# Patient Record
Sex: FEMALE | Race: WHITE | NOT HISPANIC OR LATINO | Employment: OTHER | URBAN - METROPOLITAN AREA
[De-identification: names, ages, dates, MRNs, and addresses within clinical notes are randomized per-mention and may not be internally consistent; named-entity substitution may affect disease eponyms.]

---

## 2017-10-20 ENCOUNTER — TRANSFERRED RECORDS (OUTPATIENT)
Dept: HEALTH INFORMATION MANAGEMENT | Facility: CLINIC | Age: 72
End: 2017-10-20

## 2017-10-25 ENCOUNTER — TRANSFERRED RECORDS (OUTPATIENT)
Dept: HEALTH INFORMATION MANAGEMENT | Facility: CLINIC | Age: 72
End: 2017-10-25

## 2017-11-02 ENCOUNTER — TRANSFERRED RECORDS (OUTPATIENT)
Dept: HEALTH INFORMATION MANAGEMENT | Facility: CLINIC | Age: 72
End: 2017-11-02

## 2018-06-28 ENCOUNTER — TRANSFERRED RECORDS (OUTPATIENT)
Dept: HEALTH INFORMATION MANAGEMENT | Facility: CLINIC | Age: 73
End: 2018-06-28

## 2018-07-13 ENCOUNTER — TRANSFERRED RECORDS (OUTPATIENT)
Dept: HEALTH INFORMATION MANAGEMENT | Facility: CLINIC | Age: 73
End: 2018-07-13

## 2019-12-04 ENCOUNTER — TRANSFERRED RECORDS (OUTPATIENT)
Dept: HEALTH INFORMATION MANAGEMENT | Facility: CLINIC | Age: 74
End: 2019-12-04

## 2020-03-16 ENCOUNTER — TRANSFERRED RECORDS (OUTPATIENT)
Dept: HEALTH INFORMATION MANAGEMENT | Facility: CLINIC | Age: 75
End: 2020-03-16

## 2020-12-21 ENCOUNTER — TRANSFERRED RECORDS (OUTPATIENT)
Dept: HEALTH INFORMATION MANAGEMENT | Facility: CLINIC | Age: 75
End: 2020-12-21

## 2021-01-20 NOTE — TELEPHONE ENCOUNTER
RECORDS RECEIVED FROM: Self   Date of Appt: 1/29/21   NOTES (FOR ALL VISITS) STATUS DETAILS   OFFICE NOTE from referring provider N/A    OFFICE NOTE from other specialist Care Everywhere Zaida Dixon NP @ [facility]:  11/6/20    Dr Yury Melendez @ [facility]:  3/16/20  12/4/19  6/11/19  11/29/18    Dr. Tay Rojas @ [facility]:  7/13/18   DISCHARGE SUMMARY from hospital N/A    DISCHARGE REPORT from the ER N/A    OPERATIVE REPORT N/A    MEDICATION LIST Care Everywhere    IMAGING  (FOR ALL VISITS)     EMG Care Everywhere [facility]:  11/27/17   EEG N/A    LUMBAR PUNCTURE N/A    JUSTA SCAN N/A    DEXA SCAN *NEUROSURGERY* N/A    ULTRASOUND (CAROTID BILAT) *VASCULAR* N/A    MRI (HEAD, NECK, SPINE) Received  [facility]:  MRI Brain 10/23/17  MRI Cervical Spine 10/20/17   XRAY (SPINE) *NEUROSURGERY* N/A    CT (HEAD, NECK, SPINE) N/A       Action 1/20/21 MV 2.54pm   Action Taken Imaging request faxed to [facility] for:  MRI Brain 10/23/17  MRI Cervical Spine 10/20/17     Imaging Received  1/26/21 MV 7.54am  [facility]   Image Type (x): Disc:   PACS: x   Exam Date/Name MRI Brain 10/23/17  MRI Cervical Spine 10/20/17 Comments: images resolved in PACS

## 2021-01-29 ENCOUNTER — OFFICE VISIT (OUTPATIENT)
Dept: NEUROLOGY | Facility: CLINIC | Age: 76
End: 2021-01-29
Payer: MEDICARE

## 2021-01-29 ENCOUNTER — PRE VISIT (OUTPATIENT)
Dept: NEUROLOGY | Facility: CLINIC | Age: 76
End: 2021-01-29

## 2021-01-29 VITALS
HEART RATE: 92 BPM | HEIGHT: 62 IN | SYSTOLIC BLOOD PRESSURE: 149 MMHG | WEIGHT: 132 LBS | BODY MASS INDEX: 24.29 KG/M2 | RESPIRATION RATE: 16 BRPM | DIASTOLIC BLOOD PRESSURE: 70 MMHG | OXYGEN SATURATION: 97 %

## 2021-01-29 DIAGNOSIS — F41.9 ANXIETY AND DEPRESSION: ICD-10-CM

## 2021-01-29 DIAGNOSIS — G20.A1 PARKINSON DISEASE (H): Primary | ICD-10-CM

## 2021-01-29 DIAGNOSIS — G47.52 REM SLEEP BEHAVIOR DISORDER: ICD-10-CM

## 2021-01-29 DIAGNOSIS — F32.A ANXIETY AND DEPRESSION: ICD-10-CM

## 2021-01-29 PROBLEM — M85.80 OSTEOPENIA: Status: ACTIVE | Noted: 2018-12-14

## 2021-01-29 PROBLEM — F41.1 GENERALIZED ANXIETY DISORDER: Status: ACTIVE | Noted: 2018-12-05

## 2021-01-29 PROBLEM — M25.559 ARTHRALGIA OF HIP: Status: ACTIVE | Noted: 2017-10-09

## 2021-01-29 PROBLEM — G89.29 CHRONIC LOW BACK PAIN: Status: ACTIVE | Noted: 2017-10-19

## 2021-01-29 PROBLEM — M54.50 CHRONIC LOW BACK PAIN: Status: ACTIVE | Noted: 2017-10-19

## 2021-01-29 PROBLEM — F32.9 MAJOR DEPRESSIVE DISORDER WITH SINGLE EPISODE: Status: ACTIVE | Noted: 2020-11-06

## 2021-01-29 PROBLEM — R63.4 WEIGHT LOSS: Status: ACTIVE | Noted: 2018-07-02

## 2021-01-29 PROBLEM — R52 PAIN, GENERALIZED: Status: ACTIVE | Noted: 2017-10-19

## 2021-01-29 PROBLEM — R76.8 ELEVATED ANTINUCLEAR ANTIBODY (ANA) LEVEL: Status: ACTIVE | Noted: 2017-11-02

## 2021-01-29 PROBLEM — M25.531 BILATERAL WRIST PAIN: Status: ACTIVE | Noted: 2019-10-08

## 2021-01-29 PROBLEM — M54.2 NECK PAIN: Status: ACTIVE | Noted: 2017-10-19

## 2021-01-29 PROBLEM — Z86.79 PERSONAL HISTORY OF OTHER DISEASES OF THE CIRCULATORY SYSTEM: Status: ACTIVE | Noted: 2018-05-25

## 2021-01-29 PROBLEM — M25.532 BILATERAL WRIST PAIN: Status: ACTIVE | Noted: 2019-10-08

## 2021-01-29 PROBLEM — H61.20 IMPACTED CERUMEN: Status: ACTIVE | Noted: 2018-05-17

## 2021-01-29 PROBLEM — M54.12 CERVICAL RADICULOPATHY: Status: ACTIVE | Noted: 2017-10-09

## 2021-01-29 PROBLEM — R13.10 DYSPHAGIA: Status: ACTIVE | Noted: 2018-06-29

## 2021-01-29 PROBLEM — C44.310 BASAL CELL CARCINOMA (BCC) OF FACE: Status: ACTIVE | Noted: 2018-12-05

## 2021-01-29 PROBLEM — I10 ESSENTIAL HYPERTENSION: Status: ACTIVE | Noted: 2018-12-05

## 2021-01-29 PROCEDURE — 99417 PROLNG OP E/M EACH 15 MIN: CPT | Performed by: PSYCHIATRY & NEUROLOGY

## 2021-01-29 PROCEDURE — 99205 OFFICE O/P NEW HI 60 MIN: CPT | Performed by: PSYCHIATRY & NEUROLOGY

## 2021-01-29 RX ORDER — FOLIC ACID-PYRIDOXINE-CYANOCOBALAMIN TAB 2.5-25-2 MG 2.5-25-2 MG
1 TAB ORAL EVERY 24 HOURS
COMMUNITY
Start: 2020-04-06

## 2021-01-29 RX ORDER — MELOXICAM 15 MG/1
15 TABLET ORAL
COMMUNITY
Start: 2021-01-27

## 2021-01-29 RX ORDER — FLUDROCORTISONE ACETATE 0.1 MG/1
0.1 TABLET ORAL
COMMUNITY
Start: 2020-08-25 | End: 2021-01-29

## 2021-01-29 RX ORDER — DULOXETIN HYDROCHLORIDE 30 MG/1
30 CAPSULE, DELAYED RELEASE ORAL
COMMUNITY
Start: 2020-09-15 | End: 2021-11-10

## 2021-01-29 RX ORDER — CARBIDOPA AND LEVODOPA 25; 100 MG/1; MG/1
TABLET ORAL
Qty: 360 TABLET | Refills: 4 | Status: SHIPPED | OUTPATIENT
Start: 2021-01-29 | End: 2021-08-06

## 2021-01-29 RX ORDER — HYDROCHLOROTHIAZIDE 12.5 MG/1
12.5 TABLET ORAL
COMMUNITY
Start: 2021-01-27 | End: 2021-08-06

## 2021-01-29 RX ORDER — ATORVASTATIN CALCIUM 40 MG/1
40 TABLET, FILM COATED ORAL
COMMUNITY
Start: 2021-01-27

## 2021-01-29 RX ORDER — POTASSIUM CHLORIDE 750 MG/1
10 TABLET, EXTENDED RELEASE ORAL
COMMUNITY
Start: 2021-01-27

## 2021-01-29 RX ORDER — FLUDROCORTISONE ACETATE 0.1 MG/1
0.1 TABLET ORAL DAILY
Qty: 90 TABLET | Refills: 4 | Status: SHIPPED | OUTPATIENT
Start: 2021-01-29

## 2021-01-29 RX ORDER — CARBIDOPA 25 MG/1
TABLET ORAL
Qty: 90 TABLET | Refills: 4 | Status: SHIPPED | OUTPATIENT
Start: 2021-01-29 | End: 2021-08-06

## 2021-01-29 RX ORDER — ENTACAPONE 200 MG/1
TABLET ORAL
Qty: 360 TABLET | Refills: 4 | Status: SHIPPED | OUTPATIENT
Start: 2021-01-29

## 2021-01-29 RX ORDER — CARBIDOPA AND LEVODOPA 25; 100 MG/1; MG/1
TABLET ORAL
COMMUNITY
Start: 2021-01-15 | End: 2021-01-29

## 2021-01-29 RX ORDER — TRIAMCINOLONE ACETONIDE 1 MG/G
CREAM TOPICAL
COMMUNITY
Start: 2020-11-09

## 2021-01-29 RX ORDER — LISINOPRIL 5 MG/1
TABLET ORAL
COMMUNITY
Start: 2020-12-28

## 2021-01-29 ASSESSMENT — UNIFIED PARKINSONS DISEASE RATING SCALE (UPDRS)
DYSKINESIAS_PRESENT: YES
AMPLITUDE_LIP_JAW: NORMAL: NO TREMOR.
AMPLITUDE_RLE: NORMAL: NO TREMOR.
RIGIDITY_RLE: NORMAL
FINGER_TAPPING_LEFT: NORMAL
RIGIDITY_NECK: SLIGHT: RIGIDITY ONLY DETECTED WITH ACTIVATION MANEUVER.
AMPLITUDE_LLE: NORMAL: NO TREMOR.
ARISING_CHAIR: NORMAL: ABLE TO ARISE QUICKLY WITHOUT HESITATION.
FACIAL_EXPRESSION: NORMAL.
RIGIDITY_LUE: SLIGHT: RIGIDITY ONLY DETECTED WITH ACTIVATION MANEUVER.
HANDMOVEMENTS_LEFT: NORMAL
RIGIDITY_RUE: MILD: RIGIDITY DETECTED WITHOUT THE ACTIVATION MANEUVER.  FULL RANGE OF MOTION IS EASILY ACHIEVED.
POSTURE: 0 NORMAL, NO PROBLEMS
MOVEMENTS_INTERFERE_WITH_RATINGS: NO
TOETAPPING_RIGHT: NORMAL
FINGER_TAPPING_RIGHT: SLIGHT: ANY OF THE FOLLOWING: A) THE REGULAR RHYTHM IS BROKEN WITH ONE WITH ONE OR TWO INTERRUPTIONS OR HESITATIONS OF THE MOVEMENT B) SLIGHT SLOWING C) THE AMPLITUDE DECREMENTS NEAR THE END OF THE 10 MOVEMENTS.
SPONTANEITY_OF_MOVEMENT: 0: NORMAL.  NO PROBLEMS.
CONSTANCY_TREMOR_ATREST: NORMAL: NO TREMOR.
PARKINSONS_MEDS: ON
FREEZING_GAIT: NORMAL
SPEECH: NORMAL
HANDMOVEMENTS_RIGHT: NORMAL
TOETAPPING_LEFT: NORMAL
LEG_AGILITY_RIGHT: NORMAL
RIGIDITY_LLE: NORMAL
GAIT: NORMAL
AMPLITUDE_LUE: NORMAL: NO TREMOR.
LEG_AGILITY_LEFT: NORMAL
AMPLITUDE_RUE: NORMAL: NO TREMOR.

## 2021-01-29 ASSESSMENT — PAIN SCALES - GENERAL: PAINLEVEL: NO PAIN (0)

## 2021-01-29 ASSESSMENT — MIFFLIN-ST. JEOR: SCORE: 1052
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
## 2021-01-29 NOTE — NURSING NOTE
Chief Complaint   Patient presents with     Parkinson     [facility] NEW MOVEMENT DISORDER PD       Zoltan Meyer, EMT

## 2021-01-29 NOTE — LETTER
Date:March 28, 2021      Patient was self referred, no letter generated. Do not send.        [facility] Health Information

## 2021-01-30 NOTE — PATIENT INSTRUCTIONS
"Today you spoke with Dr. Ibarra. We discussed a plan to improve your Parkinson disease symptoms. The plan we discussed is detailed below.     Plan:   - Start Lodosyn 25 mg one tab with morning dose of CD/LD.   - Start Entacapone 100 mg, take one tab with every dose of CD/LD.   - No changes to CD/LD 25/100 mg, fludrocortisone (both refilled).  - To help with dream enactment start Melatonin.   - Take melatonin 3 mg at bedtime x 7 days. May increase by 3 mg weekly up to 15 mg at bedtime. Stop at the dose that improves active dreaming.    - We encourage safe exercise daily with walking and to stay social.  - Please call Lilibeth Rust from the [place] Chapter of APDA at [phone] for information regarding PD support groups around your area.   - PD resources:  www.parkinsonmn.org  www.healtheducation.[facility]/parkinsons  - APDA Exercise Mario application can be found at: [email or web address].BringMeTheNews.net/wp-content/uploads/2019/01/APDA-Mario-Application.pdf  - Exercises to try at home:   1. Go on a walk outside     2. The American Parkinson's Disease Association and the Parkinson's Foundation both have announced a number of online activities, including at-home fitness series and educational events. The link for APDA information and activities during the stay-at-home order is here:  https://www.apdaparkinson.org/article/covid-19-overview-for-pd-community/    3. Try a new virtual class!  - [employer] has a new YouTube channel that is free: https://Tempered Mind.org  - [employer] YouTube: https://www.youtube.com/user/Catherine  - \"Parkinson Seated Exercise\": https://www.youtube.com/watch?v=KNWqyKluZgg    4. Do your BIG and LOUD exercises  - If you would like to download the BIG homework helper ($28): https://www.[employer] (S[employer]E)/store/IdaProductDetailLSVT?Ya=37a5L693090scFTNTQ  - If you would like to download the LOUD homework helper ($28): " https://www.Jule Game.Snapstream/store/IdaProductDetailLSVT?Oy=31k5C240871xzDoLUI    5. ParkinsonTV: https://www.youtube.com/channel/UCtH-STbJ_fshxFjf7Srf_zQ    6. Mateusz Grant Webinars: https://www.[employer].org/webinars\    - We discussed DBS surgery. I discussed the process of evaluation for deep brain stimulation, which will consist of MRI of the brain, neuropsychometric evaluation, video on off testing and a meeting with one of our DBS neurosurgeons (Dr. Mateusz Barrera or Dr. Torsten Oliveros).   - Medtronic has a series of short videos to help Parkinson disease patients understand DBS Therapy. The videos combine engaging patient stories, animations, and education from clinicians.  The website address is: medtronicdbs.com. This is not an endorsement for [employer].   - DBS handbook: http://www.fvfiles.com/122955.pdf  - We also discussed other surgical options for treatment of medication-refractory tremor, including MRI focused high frequency ultrasound (HFUS) thalamotomy. While initial studies of unilateral HFUS for essential tremor look promising, we do not have significant longterm data (effect may wear off like previous thalamotomies were known to) and it has not been proven safe to do bilaterally.

## 2021-03-03 ENCOUNTER — DOCUMENTATION ONLY (OUTPATIENT)
Dept: CARE COORDINATION | Facility: CLINIC | Age: 76
End: 2021-03-03

## 2021-03-07 ENCOUNTER — HEALTH MAINTENANCE LETTER (OUTPATIENT)
Age: 76
End: 2021-03-07

## 2021-03-09 ENCOUNTER — VIRTUAL VISIT (OUTPATIENT)
Dept: NEUROLOGY | Facility: CLINIC | Age: 76
End: 2021-03-09
Payer: MEDICARE

## 2021-03-09 DIAGNOSIS — F41.9 ANXIETY AND DEPRESSION: ICD-10-CM

## 2021-03-09 DIAGNOSIS — F32.A ANXIETY AND DEPRESSION: ICD-10-CM

## 2021-03-09 DIAGNOSIS — G47.52 REM SLEEP BEHAVIOR DISORDER: ICD-10-CM

## 2021-03-09 DIAGNOSIS — G20.A1 PARKINSON DISEASE (H): Primary | ICD-10-CM

## 2021-03-09 PROCEDURE — 99215 OFFICE O/P EST HI 40 MIN: CPT | Mod: 95 | Performed by: PSYCHIATRY & NEUROLOGY

## 2021-03-09 SDOH — HEALTH STABILITY: MENTAL HEALTH: HOW OFTEN DO YOU HAVE A DRINK CONTAINING ALCOHOL?: NOT ASKED

## 2021-03-09 SDOH — HEALTH STABILITY: MENTAL HEALTH: HOW OFTEN DO YOU HAVE 6 OR MORE DRINKS ON ONE OCCASION?: NOT ASKED

## 2021-03-09 SDOH — HEALTH STABILITY: MENTAL HEALTH: HOW MANY STANDARD DRINKS CONTAINING ALCOHOL DO YOU HAVE ON A TYPICAL DAY?: NOT ASKED

## 2021-03-09 ASSESSMENT — UNIFIED PARKINSONS DISEASE RATING SCALE (UPDRS)
ARISING_CHAIR: NORMAL: ABLE TO ARISE QUICKLY WITHOUT HESITATION.
AMPLITUDE_RLE: NORMAL: NO TREMOR.
FREEZING_GAIT: NORMAL
PRONATION_SUPINATION_LEFT: NORMAL
HANDMOVEMENTS_RIGHT: NORMAL
FINGER_TAPPING_LEFT: NORMAL
AMPLITUDE_LLE: NORMAL: NO TREMOR.
MOVEMENTS_INTERFERE_WITH_RATINGS: NO
LEG_AGILITY_LEFT: NORMAL
TOETAPPING_LEFT: NORMAL
LEG_AGILITY_RIGHT: NORMAL
DYSKINESIAS_PRESENT: YES
PRONATION_SUPINATION_RIGHT: NORMAL
GAIT: NORMAL
TOETAPPING_RIGHT: NORMAL
FACIAL_EXPRESSION: SLIGHT: MINIMAL MASKED FACIES MANIFESTED ONLY BY DECREASED FREQUENCY OF BLINKING.
SPONTANEITY_OF_MOVEMENT: 0: NORMAL.  NO PROBLEMS.
HANDMOVEMENTS_LEFT: NORMAL
POSTURE: 0 NORMAL, NO PROBLEMS
AMPLITUDE_LUE: NORMAL: NO TREMOR.
SPEECH: NORMAL
AMPLITUDE_LIP_JAW: NORMAL: NO TREMOR.
FINGER_TAPPING_RIGHT: NORMAL
CONSTANCY_TREMOR_ATREST: NORMAL: NO TREMOR.
AMPLITUDE_RUE: NORMAL: NO TREMOR.

## 2021-03-09 NOTE — PROGRESS NOTES
Brittani is a 76 year old who is being evaluated via a billable video visit.      How would you like to obtain your AVS? Mychart  If the video visit is dropped, the invitation should be resent by: [phone]      Video Start/END Time:  Start: 03/09/2021 02:39 pm   Stop: 03/09/2021 02:53 pm    Video-Visit Details    Type of service:  Video Visit      Originating Location (pt. Location): Home    Distant Location (provider location):  [facility] NEUROLOGY [facility]     Platform used for Video Visit: Fusion Dynamic

## 2021-03-09 NOTE — PATIENT INSTRUCTIONS
"Today you spoke with Dr. Ibarra. I am so glad you are doing well and that your Parkinson disease symptoms are controlled. The plan we discussed is detailed below.     Plan:    - No changes to her current PD regimen  - PT for the Big program to help with fatigue  - Consider small naps in the day to help with fatigue  - Exercises to try at home:   1. Go on a walk outside     2. The American Parkinson's Disease Association and the Parkinson's Foundation both have announced a number of online activities, including at-home fitness series and educational events. The link for APDA information and activities during the stay-at-home order is here:  https://www.apdaparkinson.org/article/covid-19-overview-for-pd-community/    3. Try a new virtual class!  - [employer] has a new MarketoTube channel that is free: https://ezCater  - Silver [employer] YouTube: https://www.Trendabl.com/user/JenniferverSVinveli  - \"Parkinson Seated Exercise\": https://www.Petizens.comube.com/watch?v=KNWqyKluZgg    4. Do your BIG and LOUD exercises  - If you would like to download the BIG homework helper ($28): https://www.[employer]/store/DoppelgamesLSVT?Bo=53i0K451347uiNCUFF  - If you would like to download the LOUD homework helper ($28): https://www.[employer]/store/DoppelgamesLSVT?Pr=67q6I890478mnHeZBC    5. ParkinsonTV: https://www.Petizens.comube.com/channel/UCtH-STbJ_fshxFjf7Srf_zQ    6. Mateusz Grant Webinars: https://www.sergio.org/webinars    Patient to return in 6 months, for in-person visit, 30 minutes.     "

## 2021-03-09 NOTE — LETTER
"3/9/2021       RE: Brittani Abraham  [address] [address]  [facility] [address]     Dear Colleague,    Thank you for referring your patient, Brittani Abraham, to the [facility]UROLOGY [facility]IN[facility] [place] at [facility]. Please see a copy of my visit note below.    MOVEMENT DISORDERS TELEMEDICINE (video and/or telephone) VISIT:     PATIENT: Brittani Abraham    : 1945    DATE: 21    REASON FOR VISIT: parkinson disease follow up     After a review of the patient s situation, this visit was changed from an in-person visit to a Telemedicine visit to reduce the risk of COVID-19 exposure. The patient is being evaluated via a billable Telemedicine visit.    Ms. Abraham is a 76 year old female with PMH of Parkinson disease that presents via Telemedicine for follow up.    The patient's last visit was on 21 where we started entacapone for wearing off symptoms and Lodosyn for nausea. She also started melatonin for RBSD.    History obtained from patient and , Min. Patient reports she is \"better.\" She doesn't feel that her symptoms are as harsh or difficult to deal with. She is more relaxed and less tense. She is not experiencing wearing off and is On until she takes her next dose of PD meds. She is experiencing less nausea with Lodosyn. She denies SEs to her current PD meds. Denies dyskinesias that are bothersome. She is on melatonin 5 mg at bedtime and reports that she no longer acts out her dreams and is sleeping better through the night. Her main complaint is tiredness during the day.     I have reviewed and updated the patient's Past Medical History, Social History, Family History and Medication List.    Medications:  Current Outpatient Medications   Medication Sig Dispense Refill     atorvastatin (LIPITOR) 40 MG tablet Take 40 mg by mouth       carbidopa (LODOSYN) 25 MG TABS tablet Take one tab with first dose of Sinemet in the morning. 90 tablet 4     " carbidopa-levodopa (SINEMET)  MG tablet TAKE 1 TABLET BY MOUTH FOUR TIMES DAILY = 4 tabs/day total. Take one hour before a protein rich meal or 2 hours afterward. 360 tablet 4     DULoxetine (CYMBALTA) 30 MG capsule Take 30 mg by mouth       entacapone (COMTAN) 200 MG tablet Take one tab with each dose of Sinemet. 360 tablet 4     fa-pyridoxine-cyancobalamin (FOLBIC) 2.5-25-2 MG TABS per tablet Take 1 tablet by mouth every 24 hours       fludrocortisone (FLORINEF) 0.1 MG tablet Take 1 tablet (0.1 mg) by mouth daily 90 tablet 4     hydrochlorothiazide (HYDRODIURIL) 12.5 MG tablet Take 12.5 mg by mouth       lisinopril (ZESTRIL) 5 MG tablet        meloxicam (MOBIC) 15 MG tablet Take 15 mg by mouth       potassium chloride ER (K-TAB/KLOR-CON) 10 MEQ CR tablet Take 10 mEq by mouth       triamcinolone (KENALOG) 0.1 % external cream GEN EXT AA BID PRF ITCHING OR SWELLING        Movement Disorder-related Medications                    9:30 AM  1:30 PM  4:30 PM  8:30 PM  at bedtime    Carbidopa/levodopa 25/100 mg IR 1 1 1 1     Entacapone 200 mg  1 1 1  1      Melatonin 5 mg         1        Allergies:  Penicillins    Physical Exam:  GENERAL: alert, active, attentive, appropriately groomed   HEENT: normocephalic, eyes open with no discharge  CHEST: non labored breathing   PSYCH: mood stable     Neurologic Exam:  MENTAL STATUS: Alert and oriented to person, place, time, and situation. Follows commands. Recent and remote memory intact. Attention span and concentration intact. Fund of knowledge intact to current events.   SPEECH: Fluent, intact comprehension and articulation  CN: visual fields intact, EOMIB, facial movement symmetric, hearing grossly intact to conversation, tongue protrudes midline   MOTOR: Moves all extremities equally against gravity without difficulty  Involuntary movements:  Dyskinesias observed of her head and shoulder, not bothersome or noticeable to patient  COORDINATION: no dysmetria with  "FTN  GAIT: able to rise unassisted from a seated position, decreased arm swing b/l (though was walking in a tight space) and normal stride length, no en bloc turns, no ataxia    UPDRS III  UPDRS Values 3/9/2021   Medication    R Brain DBS:    L Brain DBS:    Dyskinesia (LID) Yes   Did LID interfere No   Speech 0   Facial Expression 1   Rigidity Neck    Rigidity RUE    Rigidity LUE    Rigidity RLE    Rigidity LLE    Finger Taps R 0   Finger Taps L 0   Hand Mvt R 0   Hand Mvt L 0   Pron-/Supinate R 0   Pron-/Supinate L 0   Toe Tap R 0   Toe Tap L 0   Leg Agility R 0   Leg Agility L 0   Arise From Chair 0   Gait 0   Gait Freezing 0   Posture 0   Global Spont Mvt 0   Postural Tremor RUE 0   Postural Tremor LUE 0   Kinetic Tremor RUE 0   Kinetic Tremor LUE 0   Rest Tremor RUE 0   Rest Tremor LUE 0   Rest Tremor RLE 0   Rest Tremor LLE 0   Rest Tremor Lip/Jaw 0   Rest Tremor Constancy 0       Assessment:  Brittani Abraham is a 76 year old female with Parkinson disease and whose main concern today is fatigue. At the last visit we started entacapone for wearing off symptoms and Lodosyn for nausea. She reports improvement in her PD symptoms with adding the above meds. She also started melatonin for RBSD which has improved this symptom, also. Her main complaint is fatigue and we discussed a PT referral for the Big program and short naps in the day to mitigate this symptom. No other complaints. She is satisfied with her current PD management/treatment.    -Parkinson's disease, akinetic-rigid form: Her symptom started 4-5 years ago with feeling unsure with walking which she associates to poor coordination. Denies numbness in her feet. She also reports \"taking forever to do something\", feeling slow and stiff with the right body affected more than the left. She associated these symptoms with getting older. She also observed decreased facial expression. Patient was diagnosed with likely Parkinson's disease by Dr. Yury Melendez and " "had been following with him since October 2017. He started her on CD/LD with good response to her PD symptoms.   -Wearing off symptoms after 3 hours that is associated with anxiety, upper body rigidity, shallow breathing, and a feeling on the inside (points to chest) that she describes as \"squishy\". This symptom is improved on her current PD medication regimen.  -Dyskinesia affecting head and RUE associated with wearing off of levodopa. Not bothersome to patient.   -REM behavioral sleep disorder: Improved on melatonin.  -History of anxiety, depression, panic attacks: Currently controlled on duloxetine.     Plan:   - No changes to her current PD regimen  - PT for the Big program to help with fatigue  - Consider small naps in the day to help with fatigue  - PD exercise resources provided     Patient to return in 6 months, for in-person visit, 30 minutes.     I have reviewed the note as documented above.  This accurately captures the substance of my conversation with the patient.    40 minutes spent on date of encounter doing chart reviews and exam and documentation and further activities as noted above.     Contact time:  Start: 03/09/2021 02:39 pm   Stop: 03/09/2021 02:53 pm    Edilia Ibarra DO, MA   of Neurology   [facility]       Brittani is a 76 year old who is being evaluated via a billable video visit.      How would you like to obtain your AVS? Mychart  If the video visit is dropped, the invitation should be resent by: [phone]      Video-Visit Details    Type of service:  Video Visit    Start: 03/09/2021 02:39 pm   Stop: 03/09/2021 02:53 pm    Originating Location (pt. Location): Home    Distant Location (provider location):  [facility] NEUROLOGY [facility]IN[facility] [place]     Platform used for Video Visit: Guilherme      Again, thank you for allowing me to participate in the care of your patient.      Sincerely,    Edilia Ibarra DO      "

## 2021-06-24 NOTE — PROGRESS NOTES
"MOVEMENT DISORDERS TELEMEDICINE (video and/or telephone) VISIT:     PATIENT: Brittani Abraham    : 1945    DATE: 21    REASON FOR VISIT: parkinson disease follow up     After a review of the patient s situation, this visit was changed from an in-person visit to a Telemedicine visit to reduce the risk of COVID-19 exposure. The patient is being evaluated via a billable Telemedicine visit.    Ms. Abraham is a 76 year old female with PMH of Parkinson disease that presents via Telemedicine for follow up.    The patient's last visit was on 21 where we started entacapone for wearing off symptoms and Lodosyn for nausea. She also started melatonin for RBSD.    History obtained from patient and , Min. Patient reports she is \"better.\" She doesn't feel that her symptoms are as harsh or difficult to deal with. She is more relaxed and less tense. She is not experiencing wearing off and is On until she takes her next dose of PD meds. She is experiencing less nausea with Lodosyn. She denies SEs to her current PD meds. Denies dyskinesias that are bothersome. She is on melatonin 5 mg at bedtime and reports that she no longer acts out her dreams and is sleeping better through the night. Her main complaint is tiredness during the day.     I have reviewed and updated the patient's Past Medical History, Social History, Family History and Medication List.    Medications:  Current Outpatient Medications   Medication Sig Dispense Refill     atorvastatin (LIPITOR) 40 MG tablet Take 40 mg by mouth       carbidopa (LODOSYN) 25 MG TABS tablet Take one tab with first dose of Sinemet in the morning. 90 tablet 4     carbidopa-levodopa (SINEMET)  MG tablet TAKE 1 TABLET BY MOUTH FOUR TIMES DAILY = 4 tabs/day total. Take one hour before a protein rich meal or 2 hours afterward. 360 tablet 4     DULoxetine (CYMBALTA) 30 MG capsule Take 30 mg by mouth       entacapone (COMTAN) 200 MG tablet Take one tab with each dose of " Sinemet. 360 tablet 4     fa-pyridoxine-cyancobalamin (FOLBIC) 2.5-25-2 MG TABS per tablet Take 1 tablet by mouth every 24 hours       fludrocortisone (FLORINEF) 0.1 MG tablet Take 1 tablet (0.1 mg) by mouth daily 90 tablet 4     hydrochlorothiazide (HYDRODIURIL) 12.5 MG tablet Take 12.5 mg by mouth       lisinopril (ZESTRIL) 5 MG tablet        meloxicam (MOBIC) 15 MG tablet Take 15 mg by mouth       potassium chloride ER (K-TAB/KLOR-CON) 10 MEQ CR tablet Take 10 mEq by mouth       triamcinolone (KENALOG) 0.1 % external cream GEN EXT AA BID PRF ITCHING OR SWELLING        Movement Disorder-related Medications                    9:30 AM  1:30 PM  4:30 PM  8:30 PM  at bedtime    Carbidopa/levodopa 25/100 mg IR 1 1 1 1     Entacapone 200 mg  1 1 1  1      Melatonin 5 mg         1        Allergies:  Penicillins    Physical Exam:  GENERAL: alert, active, attentive, appropriately groomed   HEENT: normocephalic, eyes open with no discharge  CHEST: non labored breathing   PSYCH: mood stable     Neurologic Exam:  MENTAL STATUS: Alert and oriented to person, place, time, and situation. Follows commands. Recent and remote memory intact. Attention span and concentration intact. Fund of knowledge intact to current events.   SPEECH: Fluent, intact comprehension and articulation  CN: visual fields intact, EOMIB, facial movement symmetric, hearing grossly intact to conversation, tongue protrudes midline   MOTOR: Moves all extremities equally against gravity without difficulty  Involuntary movements:  Dyskinesias observed of her head and shoulder, not bothersome or noticeable to patient  COORDINATION: no dysmetria with FTN  GAIT: able to rise unassisted from a seated position, decreased arm swing b/l (though was walking in a tight space) and normal stride length, no en bloc turns, no ataxia    UPDRS III  UPDRS Values 3/9/2021   Medication    R Brain DBS:    L Brain DBS:    Dyskinesia (LID) Yes   Did LID interfere No   Speech 0  "  Facial Expression 1   Rigidity Neck    Rigidity RUE    Rigidity LUE    Rigidity RLE    Rigidity LLE    Finger Taps R 0   Finger Taps L 0   Hand Mvt R 0   Hand Mvt L 0   Pron-/Supinate R 0   Pron-/Supinate L 0   Toe Tap R 0   Toe Tap L 0   Leg Agility R 0   Leg Agility L 0   Arise From Chair 0   Gait 0   Gait Freezing 0   Posture 0   Global Spont Mvt 0   Postural Tremor RUE 0   Postural Tremor LUE 0   Kinetic Tremor RUE 0   Kinetic Tremor LUE 0   Rest Tremor RUE 0   Rest Tremor LUE 0   Rest Tremor RLE 0   Rest Tremor LLE 0   Rest Tremor Lip/Jaw 0   Rest Tremor Constancy 0       Assessment:  Brittani Abraham is a 76 year old female with Parkinson disease and whose main concern today is fatigue. At the last visit we started entacapone for wearing off symptoms and Lodosyn for nausea. She reports improvement in her PD symptoms with adding the above meds. She also started melatonin for RBSD which has improved this symptom, also. Her main complaint is fatigue and we discussed a PT referral for the Big program and short naps in the day to mitigate this symptom. No other complaints. She is satisfied with her current PD management/treatment.    -Parkinson's disease, akinetic-rigid form: Her symptom started 4-5 years ago with feeling unsure with walking which she associates to poor coordination. Denies numbness in her feet. She also reports \"taking forever to do something\", feeling slow and stiff with the right body affected more than the left. She associated these symptoms with getting older. She also observed decreased facial expression. Patient was diagnosed with likely Parkinson's disease by Dr. Yury Melendez and had been following with him since October 2017. He started her on CD/LD with good response to her PD symptoms.   -Wearing off symptoms after 3 hours that is associated with anxiety, upper body rigidity, shallow breathing, and a feeling on the inside (points to chest) that she describes as \"squishy\". This symptom " is improved on her current PD medication regimen.  -Dyskinesia affecting head and RUE associated with wearing off of levodopa. Not bothersome to patient.   -REM behavioral sleep disorder: Improved on melatonin.  -History of anxiety, depression, panic attacks: Currently controlled on duloxetine.     Plan:   - No changes to her current PD regimen  - PT for the Big program to help with fatigue  - Consider small naps in the day to help with fatigue  - PD exercise resources provided     Patient to return in 6 months, for in-person visit, 30 minutes.     I have reviewed the note as documented above.  This accurately captures the substance of my conversation with the patient.    40 minutes spent on date of encounter doing chart reviews and exam and documentation and further activities as noted above.     Contact time:  Start: 03/09/2021 02:39 pm   Stop: 03/09/2021 02:53 pm    Edilia Ibarra DO MA   of Neurology   [facility]            DC instructions

## 2021-08-06 ENCOUNTER — OFFICE VISIT (OUTPATIENT)
Dept: NEUROLOGY | Facility: CLINIC | Age: 76
End: 2021-08-06
Payer: MEDICARE

## 2021-08-06 VITALS
RESPIRATION RATE: 16 BRPM | SYSTOLIC BLOOD PRESSURE: 145 MMHG | BODY MASS INDEX: 24.07 KG/M2 | OXYGEN SATURATION: 98 % | DIASTOLIC BLOOD PRESSURE: 75 MMHG | HEART RATE: 84 BPM | WEIGHT: 131.6 LBS

## 2021-08-06 DIAGNOSIS — G20.A1 PARKINSON DISEASE (H): Primary | ICD-10-CM

## 2021-08-06 DIAGNOSIS — T42.8X5A LEVODOPA-INDUCED DYSKINESIA: ICD-10-CM

## 2021-08-06 DIAGNOSIS — G24.01 LEVODOPA-INDUCED DYSKINESIA: ICD-10-CM

## 2021-08-06 DIAGNOSIS — G47.52 REM SLEEP BEHAVIOR DISORDER: ICD-10-CM

## 2021-08-06 DIAGNOSIS — R53.83 FATIGUE, UNSPECIFIED TYPE: ICD-10-CM

## 2021-08-06 DIAGNOSIS — R11.0 NAUSEA: ICD-10-CM

## 2021-08-06 PROCEDURE — 99215 OFFICE O/P EST HI 40 MIN: CPT | Mod: GC | Performed by: PSYCHIATRY & NEUROLOGY

## 2021-08-06 RX ORDER — CARBIDOPA AND LEVODOPA 25; 100 MG/1; MG/1
TABLET ORAL
Qty: 360 TABLET | Refills: 4 | Status: SHIPPED | OUTPATIENT
Start: 2021-08-06 | End: 2021-11-10

## 2021-08-06 RX ORDER — CARBIDOPA 25 MG/1
TABLET ORAL
Qty: 180 TABLET | Refills: 4 | Status: SHIPPED | OUTPATIENT
Start: 2021-08-06

## 2021-08-06 ASSESSMENT — UNIFIED PARKINSONS DISEASE RATING SCALE (UPDRS)
SPONTANEITY_OF_MOVEMENT: 0: NORMAL.  NO PROBLEMS.
POSTURE: 0 NORMAL, NO PROBLEMS
FINGER_TAPPING_LEFT: NORMAL
ARISING_CHAIR: NORMAL: ABLE TO ARISE QUICKLY WITHOUT HESITATION.
PRONATION_SUPINATION_LEFT: NORMAL
DYSKINESIAS_PRESENT: YES
FACIAL_EXPRESSION: NORMAL.
FINGER_TAPPING_RIGHT: SLIGHT: ANY OF THE FOLLOWING: A) THE REGULAR RHYTHM IS BROKEN WITH ONE WITH ONE OR TWO INTERRUPTIONS OR HESITATIONS OF THE MOVEMENT B) SLIGHT SLOWING C) THE AMPLITUDE DECREMENTS NEAR THE END OF THE 10 MOVEMENTS.
MOVEMENTS_INTERFERE_WITH_RATINGS: NO
HANDMOVEMENTS_RIGHT: NORMAL
CONSTANCY_TREMOR_ATREST: NORMAL: NO TREMOR.
AMPLITUDE_RUE: NORMAL: NO TREMOR.
AMPLITUDE_LUE: NORMAL: NO TREMOR.
FREEZING_GAIT: NORMAL
TOETAPPING_LEFT: NORMAL
AMPLITUDE_LIP_JAW: NORMAL: NO TREMOR.
AMPLITUDE_RLE: NORMAL: NO TREMOR.
TOETAPPING_RIGHT: NORMAL
PRONATION_SUPINATION_RIGHT: NORMAL
HANDMOVEMENTS_LEFT: NORMAL
LEG_AGILITY_LEFT: NORMAL
SPEECH: NORMAL
GAIT: NORMAL
LEG_AGILITY_RIGHT: NORMAL
AMPLITUDE_LLE: NORMAL: NO TREMOR.
PARKINSONS_MEDS: ON

## 2021-08-06 ASSESSMENT — PAIN SCALES - GENERAL: PAINLEVEL: NO PAIN (0)

## 2021-08-06 NOTE — LETTER
"2021       RE: Brittani Abraham  [address] [address]  [facility] [address]     Dear Colleague,    Thank you for referring your patient, Brittani Abraham, to the [facility] NEUROLOGY CLINIC [place] at [facility]. Please see a copy of my visit note below.    Department of Neurology  Movement Disorders Division   Follow-up Note    Patient: Brittani Abraham   MRN: [id number]   : 1945   Date of Visit: 2021    INTERVAL EVENTS:  Ms. Abraham is a 76 year old female with PMH of Parkinson disease that presents for follow up. She was last seen on 2021 via telemedicine, at which time she was referred to PT.    Patient is accompanied by Mr. Abraham. She never did Big program with PT. Reports melatonin has really helped and vivid night dreams and active dreaming are resolved. Lodosyn 25 mg 1 tab not helping with nuasea; takes Lodosyn with first dose only. Has tried domperidone in the past but doesn't recall the effects. Takes 30 minutes for CD/LD to kick in. If she is late on taking her PD meds, even by 15 minutes, she starts to have shallow breathing, develops anxiety, becomes \"persnickety\", gets edgy, grinds teeth. Had dyskinesias with 2.5 tabs of CD/LD, which were bothersome.      Movement Disorder-related Medications                    9 AM 1 PM  5 PM  8 PM  at bedtime    Carbidopa/levodopa 25/100 mg IR 1 1 1 1     Entacapone 200 mg  1 1 1  1      Lodosyn 25 mg 1       Melatonin 5 mg         1    Last took CD/LD 25/100 mg and entacapone at 1 pm.    ROS:  All others negative except as listed above.    Past Medical History:   Diagnosis Date     Anxiety      Depression      Parkinson disease (H)      REM behavioral disorder       No past surgical history on file.     Current Outpatient Medications   Medication Sig Dispense Refill     atorvastatin (LIPITOR) 40 MG tablet Take 40 mg by mouth       carbidopa (LODOSYN) 25 MG TABS tablet Take 2 tabs with first dose of " Sinemet in the morning. 180 tablet 4     carbidopa-levodopa (SINEMET)  MG tablet TAKE 1 TABLET BY MOUTH EVERY 3.5 HOURS FOUR TIMES DAILY = 4 tabs/day total. Take one hour before a protein rich meal or 2 hours afterward. 360 tablet 4     DULoxetine (CYMBALTA) 30 MG capsule Take 30 mg by mouth       entacapone (COMTAN) 200 MG tablet Take one tab with each dose of Sinemet. 360 tablet 4     fa-pyridoxine-cyancobalamin (FOLBIC) 2.5-25-2 MG TABS per tablet Take 1 tablet by mouth every 24 hours       fludrocortisone (FLORINEF) 0.1 MG tablet Take 1 tablet (0.1 mg) by mouth daily 90 tablet 4     lisinopril (ZESTRIL) 5 MG tablet        meloxicam (MOBIC) 15 MG tablet Take 15 mg by mouth       potassium chloride ER (K-TAB/KLOR-CON) 10 MEQ CR tablet Take 10 mEq by mouth       triamcinolone (KENALOG) 0.1 % external cream GEN EXT AA BID PRF ITCHING OR SWELLING         Allergies   Allergen Reactions     Penicillins Swelling     Other reaction(s): Unknown        No family history on file.     Social History     Socioeconomic History     Marital status:      Spouse name: Not on file     Number of children: Not on file     Years of education: Not on file     Highest education level: Not on file   Occupational History     Not on file   Tobacco Use     Smoking status: Never Smoker     Smokeless tobacco: Never Used   Substance and Sexual Activity     Alcohol use: Not Currently     Drug use: Never     Sexual activity: Not on file   Other Topics Concern     Not on file   Social History Narrative     Not on file     Social Determinants of Health     Financial Resource Strain:      Difficulty of Paying Living Expenses:    Food Insecurity:      Worried About Running Out of Food in the Last Year:      Ran Out of Food in the Last Year:    Transportation Needs:      Lack of Transportation (Medical):      Lack of Transportation (Non-Medical):    Physical Activity:      Days of Exercise per Week:      Minutes of Exercise per Session:     Stress:      Feeling of Stress :    Social Connections:      Frequency of Communication with Friends and Family:      Frequency of Social Gatherings with Friends and Family:      Attends [employer] Services:      Active Member of Clubs or Organizations:      Attends Club or Organization Meetings:      Marital Status:    Intimate Partner Violence:      Fear of Current or Ex-Partner:      Emotionally Abused:      Physically Abused:      Sexually Abused:         PHYSICAL EXAM:   BP (!) 145/75   Pulse 84   Resp 16   Wt 59.7 kg (131 lb 9.6 oz)   SpO2 98%   BMI 24.07 kg/m      MENTAL STATUS: Alert and oriented to person, place, time, and situation. Follows commands. Recent and remote memory intact. Attention span and concentration intact. Fund of knowledge intact to current events.   SPEECH: Fluent, intact comprehension and articulation  CN: visual fields intact, EOMIB, facial movement symmetric, hearing grossly intact to conversation, tongue protrudes midline   MOTOR: Moves all extremities equally against gravity without difficulty  Involuntary movements:  Minimal dyskinesias observed of her right shoulder and arm and slightly of her head, not bothersome or noticeable to patient  COORDINATION: no dysmetria with FTN  GAIT: able to rise unassisted from a seated position, decreased arm swing b/l (though was walking in a tight space) and normal stride length, no en bloc turns, no ataxia    UPDRS III  UPDRS Values 8/6/2021   Time: 6:01 PM   Medication On   R Brain DBS: None   L Brain DBS: None   Dyskinesia (LID) Yes   Did LID interfere No   Speech 0   Facial Expression 0   Rigidity Neck    Rigidity RUE    Rigidity LUE    Rigidity RLE    Rigidity LLE    Finger Taps R 1   Finger Taps L 0   Hand Mvt R 0   Hand Mvt L 0   Pron-/Supinate R 0   Pron-/Supinate L 0   Toe Tap R 0   Toe Tap L 0   Leg Agility R 0   Leg Agility L 0   Arise From Chair 0   Gait 0   Gait Freezing 0   Posture 0   Global Spont Mvt 0   Postural Tremor RUE  "0   Postural Tremor LUE 0   Kinetic Tremor RUE 0   Kinetic Tremor LUE 0   Rest Tremor RUE 0   Rest Tremor LUE 0   Rest Tremor RLE 0   Rest Tremor LLE 0   Rest Tremor Lip/Jaw 0   Rest Tremor Constancy 0   Previous truncated score on 3/2021 1    ASSESSMENT:  Brittani Abraham is a 76 year old female with Parkinson disease and whose main concern today is wearing off and nausea.      -Parkinson's disease, akinetic-rigid form: Her symptom started 4-5 years ago with feeling unsure with walking which she associates to poor coordination. Denies numbness in her feet. She also reports \"taking forever to do something\", feeling slow and stiff with the right body affected more than the left. She associated these symptoms with getting older. She also observed decreased facial expression. Patient was diagnosed with likely Parkinson's disease by Dr. Yury Melendez and had been following with him since October 2017. He started her on CD/LD with good response to her PD symptoms.   -Wearing off symptoms after 3 hours that is associated with anxiety, upper body rigidity, shallow breathing, and a feeling on the inside (points to chest) that she describes as \"squishy\". This symptom is improved on her current PD medication regimen.  -Nausea secondary to CD/LD  -Dyskinesia affecting head and RUE associated with wearing off of levodopa. Not bothersome to patient.   -Fatigue  -REM behavioral sleep disorder: Improved on melatonin.  -History of anxiety, depression, panic attacks: Currently controlled on duloxetine.      Plan:   - Follow this new medication regimen:   - Week 1: Take CD/LD 25/100 mg 1 tab every 3.5 hours at 9 am, 12:30 pm, 4 pm, 7:30 pm. Stop taking entacapone.    - Week 2: If you feel that your PD symptoms are not well controlled and the CD/LD is not lasting until the next scheduled dose, add back the entacapone 200 mg 1 tab with every dose of CD/LD.    - Take Lodosyn 2 tabs with first dose in AM to help with nausea.  - Discussed " domperidone for nausea and will let patient know when it becomes approved at the [facility].  - Discussed PT for the Big program to help with fatigue but is currently not interested.   - Consider small naps in the day to help with fatigue.    Follow up in 3 months, virtual visit, 30 minutes.    Mary Wilkins MD  Movement Disorders Fellow       I, Edilia Ibarra DO, personally saw this patient with the Neurology resident and agree with the resident's findings and plan of care as documented in the resident's note. I personally performed salient aspects of the history and neurological examination.     I personally reviewed the medications and labs. I personally viewed the imaging, and agree with the interpretation documented by the resident.      Date of Service (when I saw the patient): 8/6/2021    Time spent with patient: 30 minutes  20 minutes spent on date of encounter doing chart reviews and exam and documentation and further activities as noted above.       Edilia Ibarra DO, MA   of Neurology   [facility]               Again, thank you for allowing me to participate in the care of your patient.      Sincerely,    Edilia Ibarra DO

## 2021-08-06 NOTE — NURSING NOTE
Chief Complaint   Patient presents with     RECHECK     p return- movement disorder     Charlene Fernandez

## 2021-08-06 NOTE — PROGRESS NOTES
"Department of Neurology  Movement Disorders Division   Follow-up Note    Patient: Brittani Abraham   MRN: [id number]   : 1945   Date of Visit: 2021    INTERVAL EVENTS:  Ms. Abraham is a 76 year old female with PMH of Parkinson disease that presents for follow up. She was last seen on 2021 via telemedicine, at which time she was referred to PT.    Patient is accompanied by Mr. Abraham. She never did Big program with PT. Reports melatonin has really helped and vivid night dreams and active dreaming are resolved. Lodosyn 25 mg 1 tab not helping with nuasea; takes Lodosyn with first dose only. Has tried domperidone in the past but doesn't recall the effects. Takes 30 minutes for CD/LD to kick in. If she is late on taking her PD meds, even by 15 minutes, she starts to have shallow breathing, develops anxiety, becomes \"persnickety\", gets edgy, grinds teeth. Had dyskinesias with 2.5 tabs of CD/LD, which were bothersome.      Movement Disorder-related Medications                    9 AM 1 PM  5 PM  8 PM  at bedtime    Carbidopa/levodopa 25/100 mg IR 1 1 1 1     Entacapone 200 mg  1 1 1  1      Lodosyn 25 mg 1       Melatonin 5 mg         1    Last took CD/LD 25/100 mg and entacapone at 1 pm.    ROS:  All others negative except as listed above.    Past Medical History:   Diagnosis Date     Anxiety      Depression      Parkinson disease (H)      REM behavioral disorder       No past surgical history on file.     Current Outpatient Medications   Medication Sig Dispense Refill     atorvastatin (LIPITOR) 40 MG tablet Take 40 mg by mouth       carbidopa (LODOSYN) 25 MG TABS tablet Take 2 tabs with first dose of Sinemet in the morning. 180 tablet 4     carbidopa-levodopa (SINEMET)  MG tablet TAKE 1 TABLET BY MOUTH EVERY 3.5 HOURS FOUR TIMES DAILY = 4 tabs/day total. Take one hour before a protein rich meal or 2 hours afterward. 360 tablet 4     DULoxetine (CYMBALTA) 30 MG capsule Take 30 mg by mouth       " entacapone (COMTAN) 200 MG tablet Take one tab with each dose of Sinemet. 360 tablet 4     fa-pyridoxine-cyancobalamin (FOLBIC) 2.5-25-2 MG TABS per tablet Take 1 tablet by mouth every 24 hours       fludrocortisone (FLORINEF) 0.1 MG tablet Take 1 tablet (0.1 mg) by mouth daily 90 tablet 4     lisinopril (ZESTRIL) 5 MG tablet        meloxicam (MOBIC) 15 MG tablet Take 15 mg by mouth       potassium chloride ER (K-TAB/KLOR-CON) 10 MEQ CR tablet Take 10 mEq by mouth       triamcinolone (KENALOG) 0.1 % external cream GEN EXT AA BID PRF ITCHING OR SWELLING         Allergies   Allergen Reactions     Penicillins Swelling     Other reaction(s): Unknown        No family history on file.     Social History     Socioeconomic History     Marital status:      Spouse name: Not on file     Number of children: Not on file     Years of education: Not on file     Highest education level: Not on file   Occupational History     Not on file   Tobacco Use     Smoking status: Never Smoker     Smokeless tobacco: Never Used   Substance and Sexual Activity     Alcohol use: Not Currently     Drug use: Never     Sexual activity: Not on file   Other Topics Concern     Not on file   Social History Narrative     Not on file     Social Determinants of Health     Financial Resource Strain:      Difficulty of Paying Living Expenses:    Food Insecurity:      Worried About Running Out of Food in the Last Year:      Ran Out of Food in the Last Year:    Transportation Needs:      Lack of Transportation (Medical):      Lack of Transportation (Non-Medical):    Physical Activity:      Days of Exercise per Week:      Minutes of Exercise per Session:    Stress:      Feeling of Stress :    Social Connections:      Frequency of Communication with Friends and Family:      Frequency of Social Gatherings with Friends and Family:      Attends Oriental orth[employer] Services:      Active Member of Clubs or Organizations:      Attends Club or Organization Meetings:       Marital Status:    Intimate Partner Violence:      Fear of Current or Ex-Partner:      Emotionally Abused:      Physically Abused:      Sexually Abused:         PHYSICAL EXAM:   BP (!) 145/75   Pulse 84   Resp 16   Wt 59.7 kg (131 lb 9.6 oz)   SpO2 98%   BMI 24.07 kg/m      MENTAL STATUS: Alert and oriented to person, place, time, and situation. Follows commands. Recent and remote memory intact. Attention span and concentration intact. Fund of knowledge intact to current events.   SPEECH: Fluent, intact comprehension and articulation  CN: visual fields intact, EOMIB, facial movement symmetric, hearing grossly intact to conversation, tongue protrudes midline   MOTOR: Moves all extremities equally against gravity without difficulty  Involuntary movements:  Minimal dyskinesias observed of her right shoulder and arm and slightly of her head, not bothersome or noticeable to patient  COORDINATION: no dysmetria with FTN  GAIT: able to rise unassisted from a seated position, decreased arm swing b/l (though was walking in a tight space) and normal stride length, no en bloc turns, no ataxia    UPDRS III  UPDRS Values 8/6/2021   Time: 6:01 PM   Medication On   R Brain DBS: None   L Brain DBS: None   Dyskinesia (LID) Yes   Did LID interfere No   Speech 0   Facial Expression 0   Rigidity Neck    Rigidity RUE    Rigidity LUE    Rigidity RLE    Rigidity LLE    Finger Taps R 1   Finger Taps L 0   Hand Mvt R 0   Hand Mvt L 0   Pron-/Supinate R 0   Pron-/Supinate L 0   Toe Tap R 0   Toe Tap L 0   Leg Agility R 0   Leg Agility L 0   Arise From Chair 0   Gait 0   Gait Freezing 0   Posture 0   Global Spont Mvt 0   Postural Tremor RUE 0   Postural Tremor LUE 0   Kinetic Tremor RUE 0   Kinetic Tremor LUE 0   Rest Tremor RUE 0   Rest Tremor LUE 0   Rest Tremor RLE 0   Rest Tremor LLE 0   Rest Tremor Lip/Jaw 0   Rest Tremor Constancy 0   Previous truncated score on 3/2021 1    ASSESSMENT:  Brittani Abraham is a 76 year old female with  "Parkinson disease and whose main concern today is wearing off and nausea.      -Parkinson's disease, akinetic-rigid form: Her symptom started 4-5 years ago with feeling unsure with walking which she associates to poor coordination. Denies numbness in her feet. She also reports \"taking forever to do something\", feeling slow and stiff with the right body affected more than the left. She associated these symptoms with getting older. She also observed decreased facial expression. Patient was diagnosed with likely Parkinson's disease by Dr. Yury Melendez and had been following with him since October 2017. He started her on CD/LD with good response to her PD symptoms.   -Wearing off symptoms after 3 hours that is associated with anxiety, upper body rigidity, shallow breathing, and a feeling on the inside (points to chest) that she describes as \"squishy\". This symptom is improved on her current PD medication regimen.  -Nausea secondary to CD/LD  -Dyskinesia affecting head and RUE associated with wearing off of levodopa. Not bothersome to patient.   -Fatigue  -REM behavioral sleep disorder: Improved on melatonin.  -History of anxiety, depression, panic attacks: Currently controlled on duloxetine.      Plan:   - Follow this new medication regimen:   - Week 1: Take CD/LD 25/100 mg 1 tab every 3.5 hours at 9 am, 12:30 pm, 4 pm, 7:30 pm. Stop taking entacapone.    - Week 2: If you feel that your PD symptoms are not well controlled and the CD/LD is not lasting until the next scheduled dose, add back the entacapone 200 mg 1 tab with every dose of CD/LD.    - Take Lodosyn 2 tabs with first dose in AM to help with nausea.  - Discussed domperidone for nausea and will [facility] patient [facility]  - Discussed PT for the Big program to help with fatigue but is currently not interested.   - Consider small naps in the day to help with fatigue.    Follow up in 3 months, virtual visit, 30 minutes.    Mary" MD Claudette  Movement Disorders Fellow       I, Edilia Ibarra DO, personally saw this patient with the Neurology resident and agree with the resident's findings and plan of care as documented in the resident's note. I personally performed salient aspects of the history and neurological examination.     I personally reviewed the medications and labs. I personally viewed the imaging, and agree with the interpretation documented by the resident.      Date of Service (when I saw the patient): 8/6/2021    Time spent with patient: 30 minutes  20 minutes spent on date of encounter doing chart reviews and exam and documentation and further activities as noted above.       Edilia Ibarra DO, MA   of Neurology   [facility]

## 2021-08-06 NOTE — PATIENT INSTRUCTIONS
Plan:   - Follow this new medication regimen:   - Week 1: Take CD/LD 25/100 mg 1 tab every 3.5 hours at 9 am, 12:30 pm, 4 pm, 7:30 pm. Stop taking entacapone.    - Week 2: If you feel that your PD symptoms are not well controlled and the CD/LD is not lasting until the next scheduled dose, add bag the entacapone 200 mg 1 tab with every dose of CD/LD.    - Take Lodosyn 2 tabs with first dose in AM to help with nausea.  - Discussed [place]idone for nausea and will let [place]  - Discussed PT for the Big program to help with fatigue but is currently not interested.   - Consider small naps in the day to help with fatigue    Follow up in 3 months, virtual visit, 30 minutes.

## 2021-08-25 ENCOUNTER — TELEPHONE (OUTPATIENT)
Dept: NEUROLOGY | Facility: CLINIC | Age: 76
End: 2021-08-25

## 2021-08-25 DIAGNOSIS — G20.A1 PARKINSON DISEASE (H): ICD-10-CM

## 2021-08-25 RX ORDER — CARBIDOPA 25 MG/1
TABLET ORAL
Qty: 180 TABLET | Refills: 4 | Status: CANCELLED | OUTPATIENT
Start: 2021-08-25

## 2021-08-25 NOTE — TELEPHONE ENCOUNTER
[facility] Call Center    Phone Message    May a detailed message be left on voicemail: yes     Reason for Call: Medication Refill Request    Has the patient contacted the pharmacy for the refill? Yes   Name of medication being requested: carbidopa (LODOSYN) 25 MG TABS tablet  Provider who prescribed the medication:   Pharmacy: [id number] - [place], [place] - [address] AT [facility] & [address]  Date medication is needed: ASAP     Pt reports that her Rx was doubled on 8/6/21 and she now needs new Rx.      Action Taken: Message routed to:  [facility] ([facility]): Neurology    Travel Screening: Not Applicable

## 2021-08-25 NOTE — TELEPHONE ENCOUNTER
I informed Bing that her RX processed through insurance and she may get I refilled when she needs it next.

## 2021-08-25 NOTE — TELEPHONE ENCOUNTER
Rx Authorization:    Requested Medication/ Dose Carbidopa/levodopa 25mg tabs    Date last refill ordered: 8/6/21    Quantity ordered: 180 tabs    # refills: 4    Date of last clinic visit with ordering provider: 8/6/21    Date of next clinic visit with ordering provider: f/u 3 months    All pertinent protocol data (lab date/result):         Include pertinent information from patients message:

## 2021-08-25 NOTE — TELEPHONE ENCOUNTER
carbidopa (LODOSYN) 25 MG TABS tablet 180 tablet 4 8/6/2021  No   Sig: Take 2 tabs with first dose of Sinemet in the morning.     Called [facility] and spoke to Queta. Savanah stated they received the prescription but is is too soon to refill because she picked this up in early July. I informed her the dose was changed and she will need a refill sooner than expected. She processed the RX through insurance and her copay is $4. She can pick it up when she needs it.

## 2021-10-11 ENCOUNTER — HEALTH MAINTENANCE LETTER (OUTPATIENT)
Age: 76
End: 2021-10-11

## 2021-11-10 ENCOUNTER — OFFICE VISIT (OUTPATIENT)
Dept: NEUROLOGY | Facility: CLINIC | Age: 76
End: 2021-11-10
Payer: MEDICARE

## 2021-11-10 VITALS — HEART RATE: 88 BPM | SYSTOLIC BLOOD PRESSURE: 130 MMHG | DIASTOLIC BLOOD PRESSURE: 60 MMHG

## 2021-11-10 DIAGNOSIS — F41.9 ANXIETY: ICD-10-CM

## 2021-11-10 DIAGNOSIS — G20.A1 PARKINSON DISEASE (H): Primary | ICD-10-CM

## 2021-11-10 PROCEDURE — 99215 OFFICE O/P EST HI 40 MIN: CPT | Performed by: PSYCHIATRY & NEUROLOGY

## 2021-11-10 RX ORDER — DULOXETIN HYDROCHLORIDE 60 MG/1
60 CAPSULE, DELAYED RELEASE ORAL DAILY
Qty: 90 CAPSULE | Refills: 3 | Status: SHIPPED | OUTPATIENT
Start: 2021-11-10

## 2021-11-10 RX ORDER — CARBIDOPA AND LEVODOPA 25; 100 MG/1; MG/1
TABLET ORAL
Qty: 405 TABLET | Refills: 3 | Status: SHIPPED | OUTPATIENT
Start: 2021-11-10

## 2021-11-10 NOTE — LETTER
"    11/10/2021         RE: Brittani Abraham  [address] [id number]  [facility] [address]        Dear Colleague,    Thank you for referring your patient, Brittani Abraham, to the [facility]. Please see a copy of my visit note below.    Department of Neurology  Movement Disorders Division     Patient: Brittani Abraham   MRN: [id number]   : 1945   Date of Visit: 11/10/21    History of Present Illness  Ms. Abraham is a 76 year old female that presents to Neurology Movement clinic for follow up regarding PD management.   Patient was last seen on 2021 where CD/LD was changed to every 3.5 hours starting at 9 am for 4 tabs daily.     History obtained from patient.  Patient is taking carbidopa/levodopa 25/100 mg 1 tab 4 times a day starting at 9 AM.  Patient reports she is not taking entacapone.  Her symptoms are relatively controlled on this PD regimen.  However, around 4 pm she feels more \"ansty\" and rigid. Legs feels like jelly feel less coordinated. Lasts 15-20 minutes. Entacapone didn't help with this symptom when she was taking it. She does notice dyskinsias. She may feel more \"squishy when she is late on CD/LD, tired, hungry\". She has neck strain on left from shoulder to base of skull x 1.5 weeks. No falls. She may have strained muscle.  She would like to improve muscle strain in neck described as burning feeling and is best in the AM but worsens as the day progresses; not improved with CD/LD. Denies recent injury to neck. Not having active dreaming. She reports coughing inconsistently.     Taking Duloxetine 30 mg 1 tab BID.      No nausea with taking Lodosyn 25 mg 2 tabs.     Review of Systems:  Other than that mentioned above, the remainder of 12 systems reviewed were negative.    PMH: unchanged  PSH: unchanged  FH: unchanged  SH: unchanged    Medications:  Current Outpatient Medications   Medication Sig Dispense Refill     atorvastatin (LIPITOR) 40 MG tablet Take 40 mg by mouth       " carbidopa (LODOSYN) 25 MG TABS tablet Take 2 tabs with first dose of Sinemet in the morning. 180 tablet 4     carbidopa-levodopa (SINEMET)  MG tablet TAKE 1 TABLET BY MOUTH EVERY 3.5 HOURS FOUR TIMES DAILY = 4 tabs/day total. Take one hour before a protein rich meal or 2 hours afterward. 360 tablet 4     DULoxetine (CYMBALTA) 30 MG capsule Take 30 mg by mouth       fa-pyridoxine-cyancobalamin (FOLBIC) 2.5-25-2 MG TABS per tablet Take 1 tablet by mouth every 24 hours       fludrocortisone (FLORINEF) 0.1 MG tablet Take 1 tablet (0.1 mg) by mouth daily 90 tablet 4     lisinopril (ZESTRIL) 5 MG tablet        meloxicam (MOBIC) 15 MG tablet Take 15 mg by mouth       potassium chloride ER (K-TAB/KLOR-CON) 10 MEQ CR tablet Take 10 mEq by mouth       triamcinolone (KENALOG) 0.1 % external cream GEN EXT AA BID PRF ITCHING OR SWELLING       entacapone (COMTAN) 200 MG tablet Take one tab with each dose of Sinemet. (Patient not taking: Reported on 11/10/2021) 360 tablet 4          Movement Disorder-related Medications                   9 am 1230 pm 4 pm 730 pm    CD/LD 25/100 mg IR 1 1 1 1    CD 25 mg  2       Duloxetine 30 mg 1   1                 Allergies   Allergen Reactions     Penicillins Swelling     Other reaction(s): Unknown          Physical Exam:  The patient's  blood pressure is 130/60 and her pulse is 88.          Impression:  Brittani Abraham is a 76 year old female with Parkinson disease. The patient's main concern today is wearing off around 4 pm. Nausea is improved with Lodosyn. We discussed a plan to improve this symptom, detailed below.    Parkinson disease   Anxiety   Neck pain, Musculoskeletal versus cervical dystonia: Will observe if increasing dose of carbidopa/levodopa will improve this symptom.    Plan:   - Increase 4 pm dose of CD/LD to 1.5 tabs  - Follow the medication regimen below:  Movement Disorder-related Medications                   9 am 1230 pm 4 pm 730 pm At bedtime    CD/LD 25/100 mg IR  1 tab 1 1.5 1    Melatonin 5 mg     1   Lodosyn 25 mg  2       - Discussed PT for the Big program to help with fatigue but is currently not interested.   - Consider small naps in the day to help with fatigue.  - Will take over prescribing duloxetine 60 mg 1 tab daily to treat anxiety.   - Financial assistance: https://www.[employer].org/find-disease-fund/  - Discussed medications to start if dyskinesias are bothersome, which is amantadine. However, alesia not start this as dyskinesias are not bothersome at this time.     Patient to return in 3 months, for in-person visit, 30 minutes.     Edilia Ibarra DO, MA   of Neurology   [facility]     56 minutes spent on date of encounter doing chart reviews and exam and documentation and further activities as noted above.                      Again, thank you for allowing me to participate in the care of your patient.        Sincerely,        Edilia Ibarra DO

## 2021-11-10 NOTE — PROGRESS NOTES
"Department of Neurology  Movement Disorders Division     Patient: Brittani Abraham   MRN: [id number]   : 1945   Date of Visit: 11/10/21    History of Present Illness  Ms. Abraham is a 76 year old female that presents to Neurology Movement clinic for follow up regarding PD management.   Patient was last seen on 2021 where CD/LD was changed to every 3.5 hours starting at 9 am for 4 tabs daily.     History obtained from patient.  Patient is taking carbidopa/levodopa 25/100 mg 1 tab 4 times a day starting at 9 AM.  Patient reports she is not taking entacapone.  Her symptoms are relatively controlled on this PD regimen.  However, around 4 pm she feels more \"ansty\" and rigid. Legs feels like jelly feel less coordinated. Lasts 15-20 minutes. Entacapone didn't help with this symptom when she was taking it. She does notice dyskinsias. She may feel more \"squishy when she is late on CD/LD, tired, hungry\". She has neck strain on left from shoulder to base of skull x 1.5 weeks. No falls. She may have strained muscle.  She would like to improve muscle strain in neck described as burning feeling and is best in the AM but worsens as the day progresses; not improved with CD/LD. Denies recent injury to neck. Not having active dreaming. She reports coughing inconsistently.     Taking Duloxetine 30 mg 1 tab BID.      No nausea with taking Lodosyn 25 mg 2 tabs.     Review of Systems:  Other than that mentioned above, the remainder of 12 systems reviewed were negative.    PMH: unchanged  PSH: unchanged  FH: unchanged  SH: unchanged    Medications:  Current Outpatient Medications   Medication Sig Dispense Refill     atorvastatin (LIPITOR) 40 MG tablet Take 40 mg by mouth       carbidopa (LODOSYN) 25 MG TABS tablet Take 2 tabs with first dose of Sinemet in the morning. 180 tablet 4     carbidopa-levodopa (SINEMET)  MG tablet TAKE 1 TABLET BY MOUTH EVERY 3.5 HOURS FOUR TIMES DAILY = 4 tabs/day total. Take one hour before a " protein rich meal or 2 hours afterward. 360 tablet 4     DULoxetine (CYMBALTA) 30 MG capsule Take 30 mg by mouth       fa-pyridoxine-cyancobalamin (FOLBIC) 2.5-25-2 MG TABS per tablet Take 1 tablet by mouth every 24 hours       fludrocortisone (FLORINEF) 0.1 MG tablet Take 1 tablet (0.1 mg) by mouth daily 90 tablet 4     lisinopril (ZESTRIL) 5 MG tablet        meloxicam (MOBIC) 15 MG tablet Take 15 mg by mouth       potassium chloride ER (K-TAB/KLOR-CON) 10 MEQ CR tablet Take 10 mEq by mouth       triamcinolone (KENALOG) 0.1 % external cream GEN EXT AA BID PRF ITCHING OR SWELLING       entacapone (COMTAN) 200 MG tablet Take one tab with each dose of Sinemet. (Patient not taking: Reported on 11/10/2021) 360 tablet 4          Movement Disorder-related Medications                   9 am 1230 pm 4 pm 730 pm    CD/LD 25/100 mg IR 1 1 1 1    CD 25 mg  2       Duloxetine 30 mg 1   1                 Allergies   Allergen Reactions     Penicillins Swelling     Other reaction(s): Unknown          Physical Exam:  The patient's  blood pressure is 130/60 and her pulse is 88.          Impression:  Brittani Abraham is a 76 year old female with Parkinson disease. The patient's main concern today is wearing off around 4 pm. Nausea is improved with Lodosyn. We discussed a plan to improve this symptom, detailed below.    Parkinson disease   Anxiety   Neck pain, Musculoskeletal versus cervical dystonia: Will observe if increasing dose of carbidopa/levodopa will improve this symptom.    Plan:   - Increase 4 pm dose of CD/LD to 1.5 tabs  - Follow the medication regimen below:  Movement Disorder-related Medications                   9 am 1230 pm 4 pm 730 pm At bedtime    CD/LD 25/100 mg IR 1 tab 1 1.5 1    Melatonin 5 mg     1   Lodosyn 25 mg  2       - Discussed PT for the Big program to help with fatigue but is currently not interested.   - Consider small naps in the day to help with fatigue.  - Will take over prescribing duloxetine 60 mg  1 tab daily to treat anxiety.   - Financial assistance: https://www.[employer].org/find-disease-fund/  - Discussed medications to start if dyskinesias are bothersome, which is amantadine. However, alesia not start this as dyskinesias are not bothersome at this time.     Patient to return in 3 months, for in-person visit, 30 minutes.     Edilia Ibarra DO, MA   of Neurology   [facility]     56 minutes spent on date of encounter doing chart reviews and exam and documentation and further activities as noted above.

## 2021-11-10 NOTE — PATIENT INSTRUCTIONS
Plan:   - Increase 4 pm dose of CD/LD to 1.5 tabs  - Follow the medication regimen below:  Movement Disorder-related Medications                   9 am 1230 pm 4 pm 730 pm At bedtime    CD/LD 25/100 mg IR 1 tab 1 1.5 1    Melatonin 5 mg     1   Lodosyn 25 mg  2       - Discussed PT for the Big program to help with fatigue but is currently not interested. Will bring it up again at next visit.   - Consider small naps in the day to help with fatigue.  - Will take over prescribing duloxetine 60 mg 1 tab daily  - Financial assistance: https://www.[employer].org/find-disease-fund/  - Discussed medications to start if dyskinesias are bothersome, which is amantadine. However, alesia not start this as dyskinesias are not bothersome.     Patient to return in 3 months, for in-person visit, 30 minutes.

## 2022-03-27 ENCOUNTER — HEALTH MAINTENANCE LETTER (OUTPATIENT)
Age: 77
End: 2022-03-27

## 2022-09-25 ENCOUNTER — HEALTH MAINTENANCE LETTER (OUTPATIENT)
Age: 77
End: 2022-09-25

## 2022-09-30 DIAGNOSIS — G20.A1 PARKINSON DISEASE (H): ICD-10-CM

## 2022-09-30 RX ORDER — CARBIDOPA 25 MG/1
TABLET ORAL
Qty: 180 TABLET | Refills: 4 | OUTPATIENT
Start: 2022-09-30

## 2022-09-30 NOTE — CONFIDENTIAL NOTE
Rx Authorization:    Requested Medication/ Dose: Carbidopa 25MG tabs    Date last refill ordered: 11/10/21    Quantity ordered: 405 tabs    # refills: 3    Date of last clinic visit with ordering provider: 11/10/21    Date of next clinic visit with ordering provider: F/U 3 months    All pertinent protocol data (lab date/result):     Include pertinent information from patients message:

## 2023-05-08 ENCOUNTER — HEALTH MAINTENANCE LETTER (OUTPATIENT)
Age: 78
End: 2023-05-08

## 2024-05-11 ENCOUNTER — HEALTH MAINTENANCE LETTER (OUTPATIENT)
Age: 79
End: 2024-05-11

## 2024-07-09 ENCOUNTER — APPOINTMENT (OUTPATIENT)
Dept: RADIOLOGY | Facility: CLINIC | Age: 79
End: 2024-07-09
Attending: EMERGENCY MEDICINE
Payer: MEDICARE

## 2024-07-09 ENCOUNTER — HOSPITAL ENCOUNTER (EMERGENCY)
Facility: CLINIC | Age: 79
Discharge: HOME | End: 2024-07-09
Attending: EMERGENCY MEDICINE | Admitting: EMERGENCY MEDICINE
Payer: MEDICARE

## 2024-07-09 VITALS
TEMPERATURE: 97.9 F | DIASTOLIC BLOOD PRESSURE: 67 MMHG | HEART RATE: 92 BPM | OXYGEN SATURATION: 97 % | WEIGHT: 110 LBS | RESPIRATION RATE: 33 BRPM | HEIGHT: 62 IN | BODY MASS INDEX: 20.24 KG/M2 | SYSTOLIC BLOOD PRESSURE: 141 MMHG

## 2024-07-09 DIAGNOSIS — S73.004A CLOSED DISLOCATION OF RIGHT HIP, INITIAL ENCOUNTER (H): ICD-10-CM

## 2024-07-09 DIAGNOSIS — M25.551 RIGHT HIP PAIN: ICD-10-CM

## 2024-07-09 PROBLEM — Z85.3 HISTORY OF MALIGNANT NEOPLASM OF BREAST: Status: ACTIVE | Noted: 2024-01-19

## 2024-07-09 PROBLEM — D50.9 IRON DEFICIENCY ANEMIA: Status: ACTIVE | Noted: 2024-02-02

## 2024-07-09 LAB
ANION GAP SERPL CALCULATED.3IONS-SCNC: 12 MMOL/L (ref 7–15)
BUN SERPL-MCNC: 27.6 MG/DL (ref 8–23)
CALCIUM SERPL-MCNC: 9.4 MG/DL (ref 8.8–10.2)
CHLORIDE SERPL-SCNC: 106 MMOL/L (ref 98–107)
CREAT SERPL-MCNC: 0.89 MG/DL (ref 0.51–0.95)
DEPRECATED HCO3 PLAS-SCNC: 26 MMOL/L (ref 22–29)
EGFRCR SERPLBLD CKD-EPI 2021: 66 ML/MIN/1.73M2
ERYTHROCYTE [DISTWIDTH] IN BLOOD BY AUTOMATED COUNT: 13 % (ref 10–15)
GLUCOSE SERPL-MCNC: 102 MG/DL (ref 70–99)
HCT VFR BLD AUTO: 34.9 % (ref 35–47)
HGB BLD-MCNC: 11.4 G/DL (ref 11.7–15.7)
MCH RBC QN AUTO: 29.9 PG (ref 26.5–33)
MCHC RBC AUTO-ENTMCNC: 32.7 G/DL (ref 31.5–36.5)
MCV RBC AUTO: 92 FL (ref 78–100)
PLATELET # BLD AUTO: 226 10E3/UL (ref 150–450)
POTASSIUM SERPL-SCNC: 4.2 MMOL/L (ref 3.4–5.3)
RBC # BLD AUTO: 3.81 10E6/UL (ref 3.8–5.2)
SODIUM SERPL-SCNC: 144 MMOL/L (ref 135–145)
WBC # BLD AUTO: 9.7 10E3/UL (ref 4–11)

## 2024-07-09 PROCEDURE — 73502 X-RAY EXAM HIP UNI 2-3 VIEWS: CPT

## 2024-07-09 PROCEDURE — 96374 THER/PROPH/DIAG INJ IV PUSH: CPT | Mod: 59

## 2024-07-09 PROCEDURE — 99285 EMERGENCY DEPT VISIT HI MDM: CPT | Mod: 25

## 2024-07-09 PROCEDURE — 99152 MOD SED SAME PHYS/QHP 5/>YRS: CPT

## 2024-07-09 PROCEDURE — 96361 HYDRATE IV INFUSION ADD-ON: CPT

## 2024-07-09 PROCEDURE — 258N000003 HC RX IP 258 OP 636: Performed by: EMERGENCY MEDICINE

## 2024-07-09 PROCEDURE — 36415 COLL VENOUS BLD VENIPUNCTURE: CPT | Performed by: EMERGENCY MEDICINE

## 2024-07-09 PROCEDURE — 80048 BASIC METABOLIC PNL TOTAL CA: CPT | Performed by: EMERGENCY MEDICINE

## 2024-07-09 PROCEDURE — 99153 MOD SED SAME PHYS/QHP EA: CPT

## 2024-07-09 PROCEDURE — 85027 COMPLETE CBC AUTOMATED: CPT | Performed by: EMERGENCY MEDICINE

## 2024-07-09 PROCEDURE — 999N000065 XR PELVIS AND HIP RIGHT 2 VIEWS

## 2024-07-09 PROCEDURE — 999N000055 HC STATISTIC END TITIAL CO2 MONITORING

## 2024-07-09 PROCEDURE — 96376 TX/PRO/DX INJ SAME DRUG ADON: CPT

## 2024-07-09 PROCEDURE — 250N000011 HC RX IP 250 OP 636: Performed by: EMERGENCY MEDICINE

## 2024-07-09 RX ORDER — FLUMAZENIL 0.1 MG/ML
0.2 INJECTION, SOLUTION INTRAVENOUS
Status: DISCONTINUED | OUTPATIENT
Start: 2024-07-09 | End: 2024-07-10 | Stop reason: HOSPADM

## 2024-07-09 RX ORDER — PROPOFOL 10 MG/ML
0.25 INJECTION, EMULSION INTRAVENOUS
Status: DISCONTINUED | OUTPATIENT
Start: 2024-07-09 | End: 2024-07-10 | Stop reason: HOSPADM

## 2024-07-09 RX ORDER — HYDROMORPHONE HYDROCHLORIDE 1 MG/ML
0.5 INJECTION, SOLUTION INTRAMUSCULAR; INTRAVENOUS; SUBCUTANEOUS ONCE
Status: COMPLETED | OUTPATIENT
Start: 2024-07-09 | End: 2024-07-09

## 2024-07-09 RX ORDER — PROPOFOL 10 MG/ML
1 INJECTION, EMULSION INTRAVENOUS ONCE
Status: COMPLETED | OUTPATIENT
Start: 2024-07-09 | End: 2024-07-09

## 2024-07-09 RX ADMIN — HYDROMORPHONE HYDROCHLORIDE 0.5 MG: 1 INJECTION, SOLUTION INTRAMUSCULAR; INTRAVENOUS; SUBCUTANEOUS at 19:20

## 2024-07-09 RX ADMIN — PROPOFOL 50 MG: 10 INJECTION, EMULSION INTRAVENOUS at 21:39

## 2024-07-09 RX ADMIN — HYDROMORPHONE HYDROCHLORIDE 0.5 MG: 1 INJECTION, SOLUTION INTRAMUSCULAR; INTRAVENOUS; SUBCUTANEOUS at 19:53

## 2024-07-09 RX ADMIN — SODIUM CHLORIDE 1000 ML: 9 INJECTION, SOLUTION INTRAVENOUS at 19:21

## 2024-07-09 ASSESSMENT — ACTIVITIES OF DAILY LIVING (ADL)
ADLS_ACUITY_SCORE: 39
ADLS_ACUITY_SCORE: 39
ADLS_ACUITY_SCORE: 35

## 2024-07-09 ASSESSMENT — COLUMBIA-SUICIDE SEVERITY RATING SCALE - C-SSRS
1. IN THE PAST MONTH, HAVE YOU WISHED YOU WERE DEAD OR WISHED YOU COULD GO TO SLEEP AND NOT WAKE UP?: NO
2. HAVE YOU ACTUALLY HAD ANY THOUGHTS OF KILLING YOURSELF IN THE PAST MONTH?: NO
6. HAVE YOU EVER DONE ANYTHING, STARTED TO DO ANYTHING, OR PREPARED TO DO ANYTHING TO END YOUR LIFE?: NO

## 2024-07-10 NOTE — ED NOTES
Bed: WWED-01  Expected date:   Expected time:   Means of arrival:   Comments:  Needs double terminal, housekeeping aware

## 2024-07-10 NOTE — ED PROVIDER NOTES
EMERGENCY DEPARTMENT ENCOUNTER      NAME: Brittani Abraham  AGE: 79 year old female  YOB: 1945  MRN: [id number]  EVALUATION DATE & TIME: 7/9/2024  6:59 PM    ED PROVIDER: Maria Eugenia Prather MD    Chief Complaint   Patient presents with    Hip Pain       FINAL IMPRESSION  1. Closed dislocation of right hip, initial encounter (H)    2. Right hip pain        MEDICAL DECISION MAKING   Brittani Abraham is a 79 year old female who presents via EMS for evaluation of left hip pain.  Patient reports that she was sitting on the side of her bed, slipped off the edge, and had immediate pain in her right hip.  She believes that it became dislocated and states that she had nearly identical symptoms in the past when she dislocated the same hip.  She landed directly on her buttock and did not state sustain any other injuries.  She was not able to get up on her own due to severity of pain in her right hip.  She denies associated numbness, tingling, saddle anesthesia, and has no pain in the distal extremity.  Patient does note that her  was just discharged from the hospital so she has been driving about 100 miles every day for the past week to visit him.  En route, patient was given fentanyl x 1 with transient improvement in pain.  Vitals on arrival here notable for elevated blood pressure but otherwise stable.    Outside records reviewed.  Patient has a history of right hip replacement and been seen at outside hospital for recurrent dislocation.  Unfortunately, these records were not available despite multiple attempts by unit coordinator, apparently due to the time of day.  Patient reports that the last time for hip was dislocated, she was seen in the ED at [facility].  This was in March 2024.    I considered a broad differential including but not limited to fracture, dislocation, subluxation, soft tissue contusion, musculoskeletal sprain/strain, ligamentous injury. No overlying laceration/wounds to suggest open fracture.  Distal CMS intact so less likely significant neurovascular injury. Will pursue workup with XR of hip/pelvis and management of pain with IV analgesic.  Patient had no other external signs of trauma or complaints of pain that I believe would necessitate imaging of the spine, head, or other extremities.    Patient had minimal improvement after 0.5 mg of Dilaudid and was given a second 0.5 mg prior to imaging.  I independently reviewed x-ray of the right hip and pelvis which revealed an obvious posterior superior dislocation of the right total hip arthroplasty.  I updated the patient with these results and we discussed management options.  We have agreed on plan for procedural sedation to facilitate reduction.  I obtained written and verbal consent for sedation and patient was moved to a larger room.  RT was called and additional RNs also came to assist.    Patient was given propofol for sedation and after this, I was able to easily reduce the hip.  Postreduction films were obtained and confirmed interval resolution of dislocation.  When patient awoke, she reported complete resolution of pain.  She tolerated procedure very well.  Please see procedure note for details.    I discussed with the patient options for further management and follow-up.  I did strongly recommend admission given the fact that this injury occurred with essentially no trauma and my concern about the joint being very unstable and potentially with an associated periprosthetic fracture.  Patient states that she needs to go home today to help take care of her  who just got out of the hospital and understands the risk in doing so.  She does plan to follow-up with her orthopedic surgeon on an outpatient basis.  She was able to safely ambulate to the bathroom without any assistance and did not require any additional pain medication.  I encouraged her to avoid flexing at the hip if at all possible and to follow-up very closely with her outpatient  providers.    At the end of the encounter, we reviewed the results, potential diagnoses, as well as return precautions and recommendations for follow up. I instructed Ms. Abraham to return to the emergency department immediately if she develops any new or worsening symptoms and provided additional verbal discharge instructions. Ms. Abraham expressed understanding and agreement with this plan of care, her questions were answered, and she was discharged in stable condition.      Considerations in Medical Decision Making  History:  Supplemental history from: Family Member/Significant Other  External Record(s) reviewed: Outpatient Record: 6/20/2024 [facility]    Work Up:  Chart documentation includes differential considered and any EKGs or imaging independently interpreted by provider, where specified.  In additional to work up documented, I considered the following work up: Documented in chart, if applicable.    External consultation:  Discussion of management with another provider: Documented in chart, if applicable    Complicating factors:  Care impacted by chronic illness: Other: arthralgia of hip, chronic low back pain, and hypertension  Care affected by social determinants of health: Access to Medical Care, No Support for Care at Home, and No Transportation for Health Care    Disposition considerations: Discharge. I discussed a prescription for opiod, but deferred after shared decision making discussion.. I recommended admission, but the patient declined.      ED COURSE  7:06 PM I met with the patient for initial interview and encounter. We discussed a plan for treatment and diagnostic interventions.  8:50 PM I reevaluated the patient for the sedation procedure  9:35 PM I reevaluated the patient   10:42 PM I reevaluated the patient. She reports she is now pain-free  11:29 PM I rechecked the patient.    MEDICATIONS GIVEN IN THE ED  Medications   sodium chloride 0.9% BOLUS 1,000 mL (0 mLs Intravenous  Stopped 7/9/24 2247)   HYDROmorphone (PF) (DILAUDID) injection 0.5 mg (0.5 mg Intravenous $Given 7/9/24 1920)   HYDROmorphone (PF) (DILAUDID) injection 0.5 mg (0.5 mg Intravenous $Given 7/9/24 1953)   propofol (DIPRIVAN) injection 10 mg/mL vial (50 mg Intravenous $Given 7/9/24 2139)       NEW PRESCRIPTIONS STARTED AT TODAY'S VISIT  Discharge Medication List as of 7/9/2024 11:31 PM             =================================================================    HPI:    Patient information was obtained from: the patient    Use of : N/A      Brittani Abraham is a 79 year old female who presents to the ED by EMS for evaluation of hip pain.    The patient reports she was sitting on the edge of the bed when she slipped around 5:30 PM, sustaining pain in her right hip radiating to her buttocks. Denies a fall, numbness, and tingly sensation. She states it is similar to when she dislocated her right hip but with less pain. Denies back pain.     She states driving 100 miles this morning prior to the hip pain. She has been driving over 100 miles the past week from home to see her  in the hospital.     She reports getting right hip surgery in March 2024 in [place], [place].     The patient denies nausea and any other complaints at this time.     Per chart review,  6/20/2024 - patient presented to [facility] in [place], [place] for a pre-op exam. She is to undergo levator advancement and resection at [facility] on July 17th with Dr. Tineo. She was seen by Dr. Maria Elena Mahmood with no increased risk for surgery.       RELEVANT HISTORY, MEDICATIONS, & ALLERGIES   Past medical history, surgical history, family history, medications, and allergies reviewed and pertinent noted in HPI.    REVIEW OF SYSTEMS:  A complete review of systems was performed with pertinent positives and negatives noted in the HPI. All other systems negative.     PHYSICAL EXAM:    Vitals: BP (!) 141/67   Pulse 92   Temp 97.9  F  "(36.6  C) (Oral)   Resp (!) 33   Ht 1.575 m (5' 2\")   Wt 49.9 kg (110 lb)   SpO2 97%   BMI 20.12 kg/m     General: Alert and interactive.  Appears uncomfortable but in no acute distress.  HENT: Atraumatic. Full AROM of neck. MMM.  Cardiovascular: Regular rate and rhythm.   Chest/Pulmonary: Normal work of breathing. Speaking in complete sentences. Lungs CTAB. No chest wall tenderness or deformities.  Abdomen: Soft, nondistended. Nontender without guarding or rebound.  Extremities: Normal AROM of all major joints of bilateral upper extremities.  Right lower extremity: Obvious deformity and tenderness to palpation over greater trochanter.  Extremity internally rotated and shortened as compared to left.  Normal range of motion of ankle and all toes.  Limited range of motion of knee and hip secondary to pain.  Sensation intact all distributions.  Brisk capillary refill.  2+ PT and DP pulses.  Skin: Warm and dry. Normal skin color.   Neuro: Speech clear. CNs grossly intact.   Psych: Normal affect/mood, cooperative, memory appropriate.      LAB  Labs Ordered and Resulted from Time of ED Arrival to Time of ED Departure   BASIC METABOLIC PANEL - Abnormal       Result Value    Sodium 144      Potassium 4.2      Chloride 106      Carbon Dioxide (CO2) 26      Anion Gap 12      Urea Nitrogen 27.6 (*)     Creatinine 0.89      GFR Estimate 66      Calcium 9.4      Glucose 102 (*)    CBC WITH PLATELETS - Abnormal    WBC Count 9.7      RBC Count 3.81      Hemoglobin 11.4 (*)     Hematocrit 34.9 (*)     MCV 92      MCH 29.9      MCHC 32.7      RDW 13.0      Platelet Count 226         RADIOLOGY  XR Pelvis and Hip Right 2 Views   Final Result   IMPRESSION: Interval reduction of previously noted right total hip arthroplasty dislocation. Previously noted possible mildly displaced periprosthetic fracture of the proximal femoral metaphysis less well visualized on this study. Recommend correlation    with point tenderness and if " clinical concern for acute fracture, consider CT for further evaluation. Redemonstration of metallic debris about the right hip. Degenerative changes of the left hip. Partially visualized degenerative changes of the lower    lumbar spine.      XR Pelvis and Hip Right 2 Views   Final Result   IMPRESSION: Posterosuperior dislocation of a right total hip arthroplasty. No evidence of component loosening. Possible mildly displaced periprosthetic fracture of the proximal femoral metaphysis involving the base of the greater trochanter with cortical    step-off and lucency on the crosstable lateral view. Scattered metallic debris over the lateral right hip.      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      NOTE: ABNORMAL REPORT      THE DICTATION ABOVE DESCRIBES AN ABNORMALITY FOR WHICH FOLLOW-UP IS NEEDED.             PROCEDURES    PROCEDURE: Sedation   INDICATIONS: Sedation is required to allow for joint reduction (hip)   SEDATION PROVIDER: Dr Maria Eugenia Prather   PROCEDURE PROVIDER: Dr Maria Eugenia Prather   LEVEL OF SEDATION: Moderate Sedation    Defined as:  Minimal = Normal response to verbal  Moderate = Responds to verbal and light tactile stimulation  Deep = Responds after repeated painful stimulation   CONSENT: Risks, benefits and alternatives were discussed with and Written consent was obtained from Patient.   PROCEDURE SPECIFIC CHECKLIST COMPLETED:   Yes   LAST ORAL INTAKE: Clear Liquids >2 hours, Full Liquids > 4 hours, and Light Meal > 6 hours   ASA CLASS: 1 - Healthy patient, no medical problems   MALLAMPATI:  I - Faucial pillars, soft palate, and uvula are visible   TIME OUT: Universal protocol was followed. TIME OUT conducted just prior to starting procedure confirmed patient identity, site/side, procedure, patient position, and availability of correct equipment. Yes    Immediately prior to initiation of sedation, reassessment of clinical condition was performed which was unchanged.   MEDICATIONS: Propofol, 50 mg, IV    MONITORING: Monitoring consisted of:   heart rate, cardiac monitor, continuous pulse oximeter, continuous capnometry (end tidal CO2), frequent blood pressure checks, level of consciousness checks, IV access, constant attendance by RN until patient is recovered, constant attendance by MD until patient is stable, and intubation and emergency airway equipment available   RESPONSE: vital signs stable, airway patent, and O2 saturations remained >92%   POST-SEDATION ASSESSMENT/NOTE: Lowest level oxygen saturation reached was 90%.    Post procedure patient was alert and responds to verbal stimuli    Patient was monitored during recovery and returned to pre-procedure baseline.   ADDITIONAL MD ASSISTANCE: None   TOTAL MD DRUG ADMINISTRATION / MONITORING TIME: 20 minutes   COMPLICATIONS:  Patient tolerated procedure well, without complication           I, Merlin Marie, am serving as a scribe to document services personally performed by Dr. Maria Eugenia Prather based on my observation and the provider's statements to me. IMaria Eugenia MD attest that Merlin Marie is acting in a scribe capacity, has observed my performance of the services and has documented them in accordance with my direction.    Maria Eugenia Prather M.D.  Emergency Medicine  [facility] EMERGENCY ROOM  Critical access hospital[address] [facility] [id number]  [phone]  Dept: [phone]     Maria Eugenia Prather MD  07/12/24 0121

## 2024-07-10 NOTE — ED TRIAGE NOTES
BIBA with c/o right hip dislocation. Pt reports she was sitting on the side of the bed and felt right hip slip out of place. Pt reports issues in the past with hip dislocation, distal pulse intact      Triage Assessment (Adult)       Row Name 07/09/24 7003          Triage Assessment    Airway WDL WDL        Respiratory WDL    Respiratory WDL WDL        Skin Circulation/Temperature WDL    Skin Circulation/Temperature WDL WDL        Cardiac WDL    Cardiac WDL X  htn        Peripheral/Neurovascular WDL    Peripheral Neurovascular WDL neurovascular assessment lower  pain in righ hip        Cognitive/Neuro/Behavioral WDL    Cognitive/Neuro/Behavioral WDL WDL        LLE Neurovascular Assessment    Temperature LLE warm     Color LLE no discoloration     Sensation LLE no tenderness        RLE Neurovascular Assessment    Temperature RLE warm     Color RLE no discoloration     Sensation RLE tenderness present

## 2024-07-10 NOTE — PROGRESS NOTES
Called to patient room for conscious sedation.  Patient placed on end tidal CO2 monitor.  Ambu bag and suction at bedside.  Patient was placed on 2 lpm of oxygen for saturations of 86% on room air at 2139, and then increased to 3 lpm.  Patient was apneic and writer bagged patient for five minutes.  Patient woke up and end tidal was reading 40 mmHg and respiratory rate of 27 bpm.  Patient weaned down to 2 lpm and then room air.  At 2154 patient awake and oxygen saturations of 100% on room air, end tidal CO2 reading of 37 and respiratory rate of 25 bpm.  Respiratory therapist excused and patient remained on the monitor.

## 2024-07-10 NOTE — DISCHARGE INSTRUCTIONS
You were seen in the emergency department today for hip pain.    Like we talked about, your hip was initially out of place when you got here but the repeat x-ray after the procedure shows that is back in place now.  I do think that it is quite unstable so it is important that you follow-up with your orthopedic surgeon for recheck.  In the meantime, please try to move around slowly and avoid flexing/bending is much as possible.     Please return to the Emergency Department if you have uncontrolled/worsening pain, recurrent dislocation, numbness, inability to keep food/fluids down, or any other new or concerning symptoms. Otherwise please follow up with your surgeon for recheck.    Included is some information that you might find informative and useful.    Thank you for choosing [facility]. It was a pleasure taking care of you today!  - Dr. Maria Eugenia Prather

## 2024-12-09 ENCOUNTER — DOCUMENTATION ONLY (OUTPATIENT)
Dept: OTHER | Facility: CLINIC | Age: 79
End: 2024-12-09
Payer: MEDICARE

## 2025-04-26 ENCOUNTER — HEALTH MAINTENANCE LETTER (OUTPATIENT)
Age: 80
End: 2025-04-26

## 2025-08-19 ENCOUNTER — MEDICAL CORRESPONDENCE (OUTPATIENT)
Dept: HEALTH INFORMATION MANAGEMENT | Facility: CLINIC | Age: 80
End: 2025-08-19
Payer: MEDICARE

## 2025-08-19 ENCOUNTER — TRANSCRIBE ORDERS (OUTPATIENT)
Dept: OTHER | Age: 80
End: 2025-08-19

## 2025-08-19 DIAGNOSIS — G20.B2 PARKINSON'S DISEASE WITH DYSKINESIA, WITH FLUCTUATIONS (H): Primary | ICD-10-CM
